# Patient Record
Sex: MALE | Race: WHITE | NOT HISPANIC OR LATINO | Employment: STUDENT | URBAN - METROPOLITAN AREA
[De-identification: names, ages, dates, MRNs, and addresses within clinical notes are randomized per-mention and may not be internally consistent; named-entity substitution may affect disease eponyms.]

---

## 2019-05-02 ENCOUNTER — OFFICE VISIT (OUTPATIENT)
Dept: URGENT CARE | Facility: CLINIC | Age: 24
End: 2019-05-02
Payer: COMMERCIAL

## 2019-05-02 VITALS
HEIGHT: 64 IN | BODY MASS INDEX: 19.46 KG/M2 | DIASTOLIC BLOOD PRESSURE: 60 MMHG | OXYGEN SATURATION: 98 % | RESPIRATION RATE: 14 BRPM | SYSTOLIC BLOOD PRESSURE: 112 MMHG | TEMPERATURE: 99.8 F | WEIGHT: 114 LBS | HEART RATE: 82 BPM

## 2019-05-02 DIAGNOSIS — J02.9 PHARYNGITIS, UNSPECIFIED ETIOLOGY: ICD-10-CM

## 2019-05-02 DIAGNOSIS — R42 VERTIGO: ICD-10-CM

## 2019-05-02 LAB
INT CON NEG: NORMAL
INT CON POS: NORMAL
S PYO AG THROAT QL: NEGATIVE

## 2019-05-02 PROCEDURE — 99203 OFFICE O/P NEW LOW 30 MIN: CPT | Performed by: PHYSICIAN ASSISTANT

## 2019-05-02 PROCEDURE — 87880 STREP A ASSAY W/OPTIC: CPT | Performed by: PHYSICIAN ASSISTANT

## 2019-05-02 RX ORDER — MECLIZINE HYDROCHLORIDE 25 MG/1
25 TABLET ORAL 3 TIMES DAILY PRN
Qty: 30 TAB | Refills: 0 | Status: SHIPPED | OUTPATIENT
Start: 2019-05-02

## 2019-05-02 NOTE — LETTER
May 2, 2019         Patient: Simon Tomas   YOB: 1995   Date of Visit: 5/2/2019           To Whom it May Concern:    Simon Tomas was seen in my clinic on 5/2/2019. Please excuse his absence from 4/30/19-5/1/19.    If you have any questions or concerns, please don't hesitate to call.        Sincerely,           Sepideh Garcia P.A.-C.  Electronically Signed

## 2019-05-03 ASSESSMENT — ENCOUNTER SYMPTOMS
VOMITING: 0
DIARRHEA: 0
SORE THROAT: 1
SHORTNESS OF BREATH: 0
NAUSEA: 0
FEVER: 0
SPUTUM PRODUCTION: 0
ABDOMINAL PAIN: 0
DIZZINESS: 1
HEADACHES: 0
MUSCULOSKELETAL NEGATIVE: 1
CHILLS: 0

## 2019-05-03 NOTE — PROGRESS NOTES
"Subjective:      Simon Tomas is a 24 y.o. male who presents with Pharyngitis (sore throat, dizziness, congestion)            Pharyngitis    Associated symptoms include congestion. Pertinent negatives include no abdominal pain, diarrhea, ear pain, headaches, shortness of breath or vomiting.     Patient presents to urgent care reporting episodes of intermittent dizziness and lightheadedness over the past few days. Symptoms are aggravated by movement and relieved with being still. He also reports being somewhat congested over the past week with post nasal drip and slight sore throat. No fevers, chills, body aches, chest pain, SOB, palpitations, headaches, ear pain, tinnitus, nausea, vomiting, or recent head trauma. No history of the same. He has no known medical problems.     Review of Systems   Constitutional: Negative for chills and fever.   HENT: Positive for congestion and sore throat. Negative for ear pain.    Respiratory: Negative for sputum production and shortness of breath.    Cardiovascular: Negative for chest pain.   Gastrointestinal: Negative for abdominal pain, diarrhea, nausea and vomiting.   Genitourinary: Negative.    Musculoskeletal: Negative.    Skin: Negative for rash.   Neurological: Positive for dizziness. Negative for headaches.        Objective:     /60 (BP Location: Left arm, Patient Position: Sitting, BP Cuff Size: Adult)   Pulse 82   Temp 37.7 °C (99.8 °F) (Temporal)   Resp 14   Ht 1.626 m (5' 4.02\")   Wt 51.7 kg (114 lb)   SpO2 98%   BMI 19.56 kg/m²      Physical Exam   Constitutional: He is oriented to person, place, and time. He appears well-developed and well-nourished. No distress.   HENT:   Head: Normocephalic and atraumatic.   Right Ear: Hearing, tympanic membrane, external ear and ear canal normal.   Left Ear: Hearing, tympanic membrane, external ear and ear canal normal.   Mouth/Throat: Posterior oropharyngeal erythema present. No oropharyngeal exudate or posterior " oropharyngeal edema.   Eyes: Pupils are equal, round, and reactive to light. Conjunctivae are normal. Right eye exhibits no discharge. Left eye exhibits no discharge.   Neck: Normal range of motion.   Cardiovascular: Normal rate, regular rhythm and normal heart sounds.    No murmur heard.  Pulmonary/Chest: Effort normal and breath sounds normal. No respiratory distress. He has no wheezes. He has no rales.   Musculoskeletal: Normal range of motion.   Neurological: He is alert and oriented to person, place, and time.   Skin: Skin is warm and dry. He is not diaphoretic.   Psychiatric: He has a normal mood and affect. His behavior is normal.   Nursing note and vitals reviewed.         PMH:  has a past medical history of ADD (attention deficit disorder).  MEDS:   Current Outpatient Prescriptions:   •  meclizine (ANTIVERT) 25 MG Tab, Take 1 Tab by mouth 3 times a day as needed., Disp: 30 Tab, Rfl: 0  •  ondansetron (ZOFRAN ODT) 4 MG TBDP, Take 1 Tab by mouth every 8 hours as needed for Nausea/Vomiting., Disp: 10 Tab, Rfl: 0  •  Lisdexamfetamine Dimesylate (VYVANSE) 40 MG CAPS, Take  by mouth., Disp: , Rfl:   •  amoxicillin-clavulanate (AUGMENTIN) 875-125 MG TABS, Take 1 Tab by mouth every 12 hours. Take with food., Disp: 20 Each, Rfl: 0  •  VENTOLIN  (90 BASE) MCG/ACT AERS, Inhale 2 Puffs by mouth every 6 hours as needed for Shortness of Breath. Please include metered dose inhaler chamber/spacer device and illustrate useage, Disp: 1 Inhaler, Rfl: 0  •  mometasone (NASONEX) 50 MCG/ACT nasal spray, Spray 2 Sprays in nose every day. Two sprays each nostril once daily, Disp: 1 Inhaler, Rfl: 0  ALLERGIES:   Allergies   Allergen Reactions   • Pollen Extract      SURGHX: History reviewed. No pertinent surgical history.  SOCHX:  reports that he has never smoked. He has never used smokeless tobacco. He reports that he does not drink alcohol or use drugs.  FH: family history is not on file.     Assessment/Plan:     1.  Vertigo  - meclizine (ANTIVERT) 25 MG Tab; Take 1 Tab by mouth 3 times a day as needed.  Dispense: 30 Tab; Refill: 0  - REFERRAL TO PHYSICAL THERAPY Reason for Therapy: Eval/Treat/Report    Trial of antivert given at today's visit. Encouraged to monitor symptoms closely for any persistent/worsening symptoms. He will follow up with physical therapy if symptoms persist/worsen. The patient demonstrated a good understanding and agreed with the treatment plan.      2. Pharyngitis, unspecified etiology  - POCT Rapid Strep A: NEGATIVE

## 2020-08-13 ENCOUNTER — APPOINTMENT (OUTPATIENT)
Dept: RADIOLOGY | Facility: IMAGING CENTER | Age: 25
End: 2020-08-13
Attending: PHYSICIAN ASSISTANT
Payer: COMMERCIAL

## 2020-08-13 ENCOUNTER — OFFICE VISIT (OUTPATIENT)
Dept: URGENT CARE | Facility: CLINIC | Age: 25
End: 2020-08-13
Payer: COMMERCIAL

## 2020-08-13 VITALS
RESPIRATION RATE: 16 BRPM | TEMPERATURE: 98.1 F | WEIGHT: 115 LBS | BODY MASS INDEX: 19.73 KG/M2 | SYSTOLIC BLOOD PRESSURE: 112 MMHG | DIASTOLIC BLOOD PRESSURE: 74 MMHG | HEART RATE: 90 BPM | OXYGEN SATURATION: 100 %

## 2020-08-13 DIAGNOSIS — S50.02XA CONTUSION OF LEFT ELBOW, INITIAL ENCOUNTER: ICD-10-CM

## 2020-08-13 DIAGNOSIS — S59.902A INJURY OF LEFT ELBOW, INITIAL ENCOUNTER: ICD-10-CM
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
## 2020-08-13 PROCEDURE — 73080 X-RAY EXAM OF ELBOW: CPT | Mod: TC,LT | Performed by: PHYSICIAN ASSISTANT

## 2020-08-13 PROCEDURE — 99214 OFFICE O/P EST MOD 30 MIN: CPT | Performed by: PHYSICIAN ASSISTANT

## 2020-08-13 ASSESSMENT — ENCOUNTER SYMPTOMS
JOINT SWELLING: 1
VISUAL CHANGE: 0
NECK PAIN: 0
HEADACHES: 0
FEVER: 0
NUMBNESS: 0
ABDOMINAL PAIN: 0
VOMITING: 0
VERTIGO: 0
FATIGUE: 0
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
## 2020-08-13 NOTE — LETTER
August 13, 2020         Patient: Simon Tomas   YOB: 1995   Date of Visit: 8/13/2020           To Whom it May Concern:    Simon Tomas was seen in my clinic on 8/13/2020. He may return to work on Saturday August 15th. Recommend no heavy lifting for 7 days.    If you have any questions or concerns, please don't hesitate to call.        Sincerely,           Erick Crowley P.A.-C.  Electronically Signed

## 2021-02-02 ENCOUNTER — TELEPHONE (OUTPATIENT)
Dept: SCHEDULING | Facility: IMAGING CENTER | Age: 26
End: 2021-02-02